# Patient Record
(demographics unavailable — no encounter records)

---

## 2021-05-15 NOTE — EVENT NOTE
ED Screening Note


ED Screening Note: 








Patient presents for abdominal pain for 8 days


He states he has been constipated for 10 days


He was given prescriptions during his last emergency room visit and states he 

did a enema with no relief


He continues to have abdominal pain and states he has been having fevers at home


Patient states that he was also seen at [facility] and discharged home


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 

trauma


He denies any other past medical history


No allergies to medicines





He denies tobacco, drug, alcohol use








This initial assessment/diagnostic orders/clinical plan/treatment(s) is/are 

subject to change based on patients health status, clinical progression and 

re-assessment by fellow clinical providers in the ED. Further treatment and 

workup at subsequent clinical providers discretion. Patient/guardian urged not 

to elope from the ED as their condition may be serious if not clinically 

assessed and managed. 





Initial orders include: 


Labs, urine

## 2021-05-18 NOTE — ELECTROCARDIOGRAPH REPORT
[facility]

                                       

Test Date:    2021-05-15               Test Time:    14:43:21

Pat Name:     LIAN MCCAULEY      Department:   

Patient ID:   [id number]          Room:          

Gender:       M                        Technician:   WILLA

:          1968               Requested By: DIANNE LARA

Order Number: [id number]              Reading MD:   Terell Werner

                                 Measurements

Intervals                              Axis          

Rate:         112                      P:            52

IN:           142                      QRS:          35

QRSD:         83                       T:            15

QT:           324                                    

QTc:          443                                    

                           Interpretive Statements

Sinus tachycardia

Otherwise normal ECG

No previous ECG available for comparison

Electronically Signed On 2021 17:43:13 EDT by Terell Werner